# Patient Record
Sex: MALE | URBAN - METROPOLITAN AREA
[De-identification: names, ages, dates, MRNs, and addresses within clinical notes are randomized per-mention and may not be internally consistent; named-entity substitution may affect disease eponyms.]

---

## 2021-03-01 ENCOUNTER — OFFICE VISIT (OUTPATIENT)
Dept: ORTHOPEDICS CLINIC | Facility: CLINIC | Age: 31
End: 2021-03-01

## 2021-03-01 VITALS — HEART RATE: 95 BPM | OXYGEN SATURATION: 100 %

## 2021-03-01 DIAGNOSIS — S62.350A CLOSED NONDISPLACED FRACTURE OF SHAFT OF SECOND METACARPAL BONE OF RIGHT HAND, INITIAL ENCOUNTER: Primary | ICD-10-CM

## 2021-03-01 PROCEDURE — 99203 OFFICE O/P NEW LOW 30 MIN: CPT | Performed by: ORTHOPAEDIC SURGERY

## 2021-03-24 NOTE — H&P
[facility] New Patient Note    CC: Right second metacarpal fracture    HPI: This 33year old  male with a right second metacarpal fracture sustained after an injury.   Patient initially presented to the [facility] where x-rays confirmed right index finger metacarpal fracture with no significant angulation. Assessment/Plan:  33year old male with a right second metacarpal fracture with comminution and no significant displacement. On clinical examination she has no rotational deformity.